# Patient Record
Sex: MALE | URBAN - METROPOLITAN AREA
[De-identification: names, ages, dates, MRNs, and addresses within clinical notes are randomized per-mention and may not be internally consistent; named-entity substitution may affect disease eponyms.]

---

## 2019-04-15 ENCOUNTER — APPOINTMENT (RX ONLY)
Dept: URBAN - METROPOLITAN AREA CLINIC 45 | Facility: CLINIC | Age: 44
Setting detail: DERMATOLOGY
End: 2019-04-15

## 2019-04-15 DIAGNOSIS — B00.1 HERPESVIRAL VESICULAR DERMATITIS: ICD-10-CM | Status: RESOLVING

## 2019-04-15 PROBLEM — L26 EXFOLIATIVE DERMATITIS: Status: ACTIVE | Noted: 2019-04-15

## 2019-04-15 PROCEDURE — ? TREATMENT REGIMEN

## 2019-04-15 PROCEDURE — 99202 OFFICE O/P NEW SF 15 MIN: CPT

## 2019-04-15 PROCEDURE — ? COUNSELING

## 2019-04-15 PROCEDURE — ? OTHER

## 2019-04-15 ASSESSMENT — LOCATION DETAILED DESCRIPTION DERM: LOCATION DETAILED: LEFT SUPERIOR VERMILION LIP

## 2019-04-15 ASSESSMENT — LOCATION ZONE DERM: LOCATION ZONE: LIP

## 2019-04-15 ASSESSMENT — LOCATION SIMPLE DESCRIPTION DERM: LOCATION SIMPLE: LEFT LIP

## 2019-04-15 NOTE — PROCEDURE: TREATMENT REGIMEN
Plan: Patient was treated with Amoxicillin by pcp and took the full course. Recommended daily moisturizing and avoid peeling skin, avoid prolonged exposure to water and trim scaling skin with scissors.\\nExplained to patient it was most likely a reaction to his previous throat infection; discussed possible ASO Titer to identify if he had a Strep infection-does not want to do at this time; discussed if does not resolve over next few weeks or worsens, RTC for further treatment
Detail Level: Detailed

## 2019-04-15 NOTE — HPI: SKIN LESION
Is This A New Presentation, Or A Follow-Up?: Skin Lesion
What Type Of Note Output Would You Prefer (Optional)?: Bullet Format
How Severe Is Your Skin Lesion?: mild
Has Your Skin Lesion Been Treated?: not been treated
Additional History: He states that the lesion came up when he was sick and looked like a blister, he states that it seems to be resolving.

## 2019-04-15 NOTE — PROCEDURE: OTHER
Detail Level: Zone
Note Text (......Xxx Chief Complaint.): This diagnosis correlates with the
Other (Free Text): consulted with Dr. Sargent

## 2019-04-15 NOTE — HPI: RASH
What Type Of Note Output Would You Prefer (Optional)?: Bullet Format
How Severe Is Your Rash?: mild
Is This A New Presentation, Or A Follow-Up?: Rash
Additional History: He states that it has recently started on his right foot with a small spot. He also states that he started Zyrtec 4 days ago but has not taken it today.